# Patient Record
Sex: MALE | NOT HISPANIC OR LATINO | Employment: FULL TIME | URBAN - METROPOLITAN AREA
[De-identification: names, ages, dates, MRNs, and addresses within clinical notes are randomized per-mention and may not be internally consistent; named-entity substitution may affect disease eponyms.]

---

## 2023-04-17 ENCOUNTER — OFFICE VISIT (OUTPATIENT)
Dept: FAMILY MEDICINE | Facility: CLINIC | Age: 48
End: 2023-04-17
Payer: COMMERCIAL

## 2023-04-17 VITALS
SYSTOLIC BLOOD PRESSURE: 138 MMHG | DIASTOLIC BLOOD PRESSURE: 82 MMHG | OXYGEN SATURATION: 97 % | WEIGHT: 221 LBS | HEIGHT: 69 IN | TEMPERATURE: 97.1 F | HEART RATE: 86 BPM | BODY MASS INDEX: 32.73 KG/M2 | RESPIRATION RATE: 16 BRPM

## 2023-04-17 DIAGNOSIS — Z11.4 SCREENING FOR HIV (HUMAN IMMUNODEFICIENCY VIRUS): ICD-10-CM

## 2023-04-17 DIAGNOSIS — Z13.220 LIPID SCREENING: ICD-10-CM

## 2023-04-17 DIAGNOSIS — Z28.21 IMMUNIZATION DECLINED: ICD-10-CM

## 2023-04-17 DIAGNOSIS — Z82.49 FAMILY HISTORY OF CARDIAC DISORDER: ICD-10-CM

## 2023-04-17 DIAGNOSIS — Z13.1 SCREENING FOR DIABETES MELLITUS: ICD-10-CM

## 2023-04-17 DIAGNOSIS — Z12.11 SCREEN FOR COLON CANCER: ICD-10-CM

## 2023-04-17 DIAGNOSIS — Z11.59 NEED FOR HEPATITIS B SCREENING TEST: ICD-10-CM

## 2023-04-17 DIAGNOSIS — Z11.59 NEED FOR HEPATITIS C SCREENING TEST: ICD-10-CM

## 2023-04-17 DIAGNOSIS — Z00.00 ROUTINE GENERAL MEDICAL EXAMINATION AT A HEALTH CARE FACILITY: ICD-10-CM

## 2023-04-17 DIAGNOSIS — Z12.5 SCREENING FOR PROSTATE CANCER: ICD-10-CM

## 2023-04-17 LAB
CHOLEST SERPL-MCNC: 230 MG/DL
HBA1C MFR BLD: 5.8 % (ref 0–5.6)
HBV CORE AB SERPL QL IA: NONREACTIVE
HBV SURFACE AB SERPL IA-ACNC: 1.76 M[IU]/ML
HBV SURFACE AB SERPL IA-ACNC: NONREACTIVE M[IU]/ML
HBV SURFACE AG SERPL QL IA: NONREACTIVE
HDLC SERPL-MCNC: 48 MG/DL
LDLC SERPL CALC-MCNC: 139 MG/DL
NONHDLC SERPL-MCNC: 182 MG/DL
PSA SERPL DL<=0.01 NG/ML-MCNC: 0.72 NG/ML (ref 0–3.5)
TRIGL SERPL-MCNC: 216 MG/DL

## 2023-04-17 PROCEDURE — 99386 PREV VISIT NEW AGE 40-64: CPT | Performed by: FAMILY MEDICINE

## 2023-04-17 PROCEDURE — 87340 HEPATITIS B SURFACE AG IA: CPT | Performed by: FAMILY MEDICINE

## 2023-04-17 PROCEDURE — G0103 PSA SCREENING: HCPCS | Performed by: FAMILY MEDICINE

## 2023-04-17 PROCEDURE — 83036 HEMOGLOBIN GLYCOSYLATED A1C: CPT | Performed by: FAMILY MEDICINE

## 2023-04-17 PROCEDURE — 86704 HEP B CORE ANTIBODY TOTAL: CPT | Performed by: FAMILY MEDICINE

## 2023-04-17 PROCEDURE — 99213 OFFICE O/P EST LOW 20 MIN: CPT | Mod: 25 | Performed by: FAMILY MEDICINE

## 2023-04-17 PROCEDURE — 80061 LIPID PANEL: CPT | Performed by: FAMILY MEDICINE

## 2023-04-17 PROCEDURE — 86706 HEP B SURFACE ANTIBODY: CPT | Performed by: FAMILY MEDICINE

## 2023-04-17 PROCEDURE — 36415 COLL VENOUS BLD VENIPUNCTURE: CPT | Performed by: FAMILY MEDICINE

## 2023-04-17 ASSESSMENT — ENCOUNTER SYMPTOMS
SORE THROAT: 0
JOINT SWELLING: 0
HEARTBURN: 0
HEMATURIA: 0
SHORTNESS OF BREATH: 0
DIARRHEA: 0
HEMATOCHEZIA: 0
ABDOMINAL PAIN: 0
HEADACHES: 0
PALPITATIONS: 0
WEAKNESS: 0
DYSURIA: 0
PARESTHESIAS: 0
FEVER: 0
DIZZINESS: 0
EYE PAIN: 0
ARTHRALGIAS: 0
COUGH: 0
NAUSEA: 0
MYALGIAS: 0
CHILLS: 0
NERVOUS/ANXIOUS: 0
FREQUENCY: 0
CONSTIPATION: 0

## 2023-04-17 ASSESSMENT — PAIN SCALES - GENERAL: PAINLEVEL: NO PAIN (0)

## 2023-04-17 NOTE — LETTER
April 19, 2023      Martinez Guidry  [address]  [place] [address]        Dear ,    We are writing to inform you of your test results.    Your total cholesterol and LDL (bad cholesterol) were elevated.     The 10-year ASCVD risk score (Wilmer CERVANTES, et al., 2019) is: 5%     Values used to calculate the score:       Age: 48 years       Sex: Male       Is Non- : No       Diabetic: No       Tobacco smoker: No       Systolic Blood Pressure: 138 mmHg       Is BP treated: No       HDL Cholesterol: 48 mg/dL       Total Cholesterol: 230 mg/dL     As you may know, abnormal cholesterol is one factor that increases your risk for heart disease and stroke. You can improve your cholesterol by controlling the amount and type of fat you eat and by increasing your daily activity level.     Here are some ways to improve your nutrition (adapted from the American Academy of [employer] handout):   Eat less fat (especially butter, Crisco and other saturated fats)   Buy lean cuts of meat; reduce your portions of red meat or substitute poultry or fish   Use skim milk and low-fat dairy products   Eat no more than 4 egg yolks per week   Avoid fried or fast foods that are high in fat   Eat more fruits and vegetables     Your blood sugars were in the prediabetes range. I would recommend low carbohydrate diet and staying active. Please follow up in six months to recheck your levels.     You are negative for hepatitis B, but you are not immune. You are at risk for lelo this in the future if you are not vaccinated.  I recommend getting a 3-shot series over about six months to protect you.  Please call the clinic anytime to schedule a visit for these shots.       Your remaining labs were stable.     Please call or message me if you have questions or concerns.     Resulted Orders   Lipid panel reflex to direct LDL Fasting   Result Value Ref Range    Cholesterol 230 (H) <200 mg/dL    Triglycerides  216 (H) <150 mg/dL    Direct Measure HDL 48 >=40 mg/dL    LDL Cholesterol Calculated 139 (H) <=100 mg/dL    Non HDL Cholesterol 182 (H) <130 mg/dL    Narrative    Cholesterol  Desirable:  <200 mg/dL    Triglycerides  Normal:  Less than 150 mg/dL  Borderline High:  150-199 mg/dL  High:  200-499 mg/dL  Very High:  Greater than or equal to 500 mg/dL    Direct Measure HDL  Female:  Greater than or equal to 50 mg/dL   Male:  Greater than or equal to 40 mg/dL    LDL Cholesterol  Desirable:  <100mg/dL  Above Desirable:  100-129 mg/dL   Borderline High:  130-159 mg/dL   High:  160-189 mg/dL   Very High:  >= 190 mg/dL    Non HDL Cholesterol  Desirable:  130 mg/dL  Above Desirable:  130-159 mg/dL  Borderline High:  160-189 mg/dL  High:  190-219 mg/dL  Very High:  Greater than or equal to 220 mg/dL   Hemoglobin A1c   Result Value Ref Range    Hemoglobin A1C 5.8 (H) 0.0 - 5.6 %      Comment:      Normal <5.7%   Prediabetes 5.7-6.4%    Diabetes 6.5% or higher     Note: Adopted from ADA consensus guidelines.   PSA, screen   Result Value Ref Range    Prostate Specific Antigen Screen 0.72 0.00 - 3.50 ng/mL    Narrative    This result is obtained using the Roche Elecsys total PSA method on the larry e801 immunoassay analyzer. Results obtained with different assay methods or kits cannot be used interchangeably.   Hepatitis B core antibody   Result Value Ref Range    Hepatitis B Core Antibody Total Nonreactive Nonreactive   Hepatitis B Surface Antibody   Result Value Ref Range    Hepatitis B Surface Antibody Instrument Value 1.76 <8.00 m[IU]/mL    Hepatitis B Surface Antibody Nonreactive       Comment:      No antibody detected when the value is less than 8.00 mIU/mL.   Hepatitis B surface antigen   Result Value Ref Range    Hepatitis B Surface Antigen Nonreactive Nonreactive       If you have any questions or concerns, please call the clinic at the number listed above.       Sincerely,      Mt Ragsdale MD / [facility]

## 2023-04-17 NOTE — PROGRESS NOTES
SUBJECTIVE:   CC: Martinez is an 48 year old who presents for preventative health visit.       4/17/2023     8:14 AM   Additional Questions   Roomed by Laron VALENZUELA RN   Patient has been advised of split billing requirements and indicates understanding: Yes  Healthy Habits:     Getting at least 3 servings of Calcium per day:  Yes    Bi-annual eye exam:  Yes    Dental care twice a year:  NO    Sleep apnea or symptoms of sleep apnea:  None    Diet:  Regular (no restrictions)    Frequency of exercise:  2-3 days/week    Duration of exercise:  15-30 minutes    Taking medications regularly:  Yes    Medication side effects:  None    PHQ-2 Total Score: 0    Additional concerns today:  Yes    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 BP goal for flight physical 155/90.   His wife was checking his home BP. FH - History of HTN Dad, paternal grandfather, paternal aunt. Dad and aunt have history of idiopathic cardiomyopathy.   2002 - He had echo which was normal.   At home his home BP was elevated.   Limited salt intake.  He walks dog 30 minutes/day.       Today's PHQ-2 Score:       4/17/2023     8:02 AM   PHQ-2 ( 1999 [employer])   Q1: Little interest or pleasure in doing things 0   Q2: Feeling down, depressed or hopeless 0   PHQ-2 Score 0   Q1: Little interest or pleasure in doing things Not at all    Not at all   Q2: Feeling down, depressed or hopeless Not at all    Not at all   PHQ-2 Score 0    0     Have you ever done Advance Care Planning? (For example, a Health Directive, POLST, or a discussion with a medical provider or your loved ones about your wishes): Yes, patient states has an Advance Care Planning document and will bring a copy to the clinic.    Social History     Tobacco Use     Smoking status: Never     Passive exposure: Past     Smokeless tobacco: Never   Vaping Use     Vaping status: Never Used   Substance Use Topics     Alcohol use: Not on file             4/17/2023     8:02 AM  "  Alcohol Use   Prescreen: >3 drinks/day or >7 drinks/week? No     Last PSA: No results found for: PSA    Reviewed orders with patient. Reviewed health maintenance and updated orders accordingly - Yes      Reviewed and updated as needed this visit by clinical staff   Tobacco  Allergies               Reviewed and updated as needed this visit by Provider                   Review of Systems   Constitutional: Negative for chills and fever.   HENT: Positive for hearing loss. Negative for congestion, ear pain and sore throat.    Eyes: Negative for pain and visual disturbance.   Respiratory: Negative for cough and shortness of breath.    Cardiovascular: Negative for chest pain, palpitations and peripheral edema.   Gastrointestinal: Negative for abdominal pain, constipation, diarrhea, heartburn, hematochezia and nausea.   Genitourinary: Positive for impotence. Negative for dysuria, frequency, genital sores, hematuria, penile discharge and urgency.   Musculoskeletal: Negative for arthralgias, joint swelling and myalgias.   Skin: Negative for rash.   Neurological: Negative for dizziness, weakness, headaches and paresthesias.   Psychiatric/Behavioral: Negative for mood changes. The patient is not nervous/anxious.      He had viagra prescription, he was having issues with marriage. He no longer takes medication.     Chronic left hearing changes. Declined audiology referral    OBJECTIVE:   BP (!) 146/94 (BP Location: Right arm, Patient Position: Sitting, Cuff Size: Adult Large)   Pulse 86   Temp 97.1  F (36.2  C) (Temporal)   Resp 16   Ht 1.76 m (5' 9.29\")   Wt 100.2 kg (221 lb)   SpO2 97%   BMI 32.36 kg/m    BP (!) 146/100   Pulse 86   Temp 97.1  F (36.2  C) (Temporal)   Resp 16   Ht 1.76 m (5' 9.29\")   Wt 100.2 kg (221 lb)   SpO2 97%   BMI 32.36 kg/m     /82 (BP Location: Right arm, Patient Position: Sitting, Cuff Size: Adult Large)   Pulse 86   Temp 97.1  F (36.2  C) (Temporal)   Resp 16   Ht 1.76 m " "(5' 9.29\")   Wt 100.2 kg (221 lb)   SpO2 97%   BMI 32.36 kg/m    Physical Exam          ASSESSMENT/PLAN:     Routine general medical examination at a health care facility  - Declined dermatology, HIV and hepatitis C     Screen for colon cancer  - COLOGVICKI(Face to Face Live); Future    Family history of cardiac disorder  - BP - advised low salt diet and staying active  - Adult Cardiology Eval  Referral; Future    Screening for HIV (human immunodeficiency virus)    Need for hepatitis C screening test    Lipid screening  - Lipid panel reflex to direct LDL Fasting; Future  - Lipid panel reflex to direct LDL Fasting    Screening for diabetes mellitus  - Hemoglobin A1c; Future  - Hemoglobin A1c    Screening for prostate cancer  - PSA, screen; Future  - PSA, screen    Need for hepatitis B screening test  - Hepatitis B core antibody; Future  - Hepatitis B Surface Antibody; Future  - Hepatitis B surface antigen; Future  - Hepatitis B core antibody  - Hepatitis B Surface Antibody  - Hepatitis B surface antigen    Immunization declined        Patient has been advised of split billing requirements and indicates understanding: Yes      COUNSELING:   Reviewed preventive health counseling, as reflected in patient instructions        He reports that he has never smoked. He has been exposed to tobacco smoke. He has never used smokeless tobacco.            Mt Ragsdale MD  [facility]  "

## 2023-04-19 NOTE — RESULT ENCOUNTER NOTE
Please send the letter to the patient with the following.     Here are your results for your recent labs.     Your total cholesterol and LDL (bad cholesterol) were elevated.     The 10-year ASCVD risk score (Wilmer CERVANTES, et al., 2019) is: 5%    Values used to calculate the score:      Age: 48 years      Sex: Male      Is Non- : No      Diabetic: No      Tobacco smoker: No      Systolic Blood Pressure: 138 mmHg      Is BP treated: No      HDL Cholesterol: 48 mg/dL      Total Cholesterol: 230 mg/dL     As you may know, abnormal cholesterol is one factor that increases your risk for heart disease and stroke. You can improve your cholesterol by controlling the amount and type of fat you eat and by increasing your daily activity level.    Here are some ways to improve your nutrition (adapted from the American Academy of Family Practice handout):  Eat less fat (especially butter, Crisco and other saturated fats)  Buy lean cuts of meat; reduce your portions of red meat or substitute poultry or fish  Use skim milk and low-fat dairy products  Eat no more than 4 egg yolks per week  Avoid fried or fast foods that are high in fat  Eat more fruits and vegetables    Your blood sugars were in the prediabetes range. I would recommend low carbohydrate diet and staying active. Please follow up in six months to recheck your levels.     You are negative for hepatitis B, but you are not immune. You are at risk for lelo this in the future if you are not vaccinated.  I recommend getting a 3-shot series over about six months to protect you.  Please call the clinic anytime to schedule a visit for these shots.      Your remaining labs were stable.     Please call or message me if you have questions or concerns.

## 2023-04-24 ENCOUNTER — LAB (OUTPATIENT)
Dept: FAMILY MEDICINE | Facility: CLINIC | Age: 48
End: 2023-04-24
Payer: COMMERCIAL

## 2023-04-24 DIAGNOSIS — Z12.11 SCREEN FOR COLON CANCER: ICD-10-CM
